# Patient Record
Sex: MALE | Race: WHITE | NOT HISPANIC OR LATINO
[De-identification: names, ages, dates, MRNs, and addresses within clinical notes are randomized per-mention and may not be internally consistent; named-entity substitution may affect disease eponyms.]

---

## 2017-02-22 ENCOUNTER — APPOINTMENT (OUTPATIENT)
Dept: UROLOGY | Facility: CLINIC | Age: 62
End: 2017-02-22

## 2017-02-22 VITALS
SYSTOLIC BLOOD PRESSURE: 114 MMHG | HEART RATE: 58 BPM | DIASTOLIC BLOOD PRESSURE: 67 MMHG | WEIGHT: 163 LBS | BODY MASS INDEX: 23.34 KG/M2 | HEIGHT: 70 IN | TEMPERATURE: 97.5 F | RESPIRATION RATE: 17 BRPM

## 2017-02-27 ENCOUNTER — TRANSCRIPTION ENCOUNTER (OUTPATIENT)
Age: 62
End: 2017-02-27

## 2017-02-28 LAB — COMPN STONE: NORMAL

## 2017-08-23 ENCOUNTER — OUTPATIENT (OUTPATIENT)
Dept: OUTPATIENT SERVICES | Facility: HOSPITAL | Age: 62
LOS: 1 days | End: 2017-08-23
Payer: COMMERCIAL

## 2017-08-23 ENCOUNTER — APPOINTMENT (OUTPATIENT)
Dept: UROLOGY | Facility: CLINIC | Age: 62
End: 2017-08-23
Payer: COMMERCIAL

## 2017-08-23 VITALS
BODY MASS INDEX: 30.58 KG/M2 | WEIGHT: 162 LBS | DIASTOLIC BLOOD PRESSURE: 64 MMHG | TEMPERATURE: 98.6 F | HEART RATE: 71 BPM | HEIGHT: 61 IN | RESPIRATION RATE: 17 BRPM | SYSTOLIC BLOOD PRESSURE: 100 MMHG

## 2017-08-23 DIAGNOSIS — R53.0 NEOPLASTIC (MALIGNANT) RELATED FATIGUE: ICD-10-CM

## 2017-08-23 PROCEDURE — 99213 OFFICE O/P EST LOW 20 MIN: CPT | Mod: 25

## 2017-08-23 PROCEDURE — 76775 US EXAM ABDO BACK WALL LIM: CPT | Mod: 26

## 2017-08-23 PROCEDURE — 76775 US EXAM ABDO BACK WALL LIM: CPT

## 2017-08-25 DIAGNOSIS — N20.0 CALCULUS OF KIDNEY: ICD-10-CM

## 2017-08-28 LAB — PSA SERPL-MCNC: 0.68 NG/ML

## 2017-09-08 ENCOUNTER — APPOINTMENT (OUTPATIENT)
Dept: UROLOGY | Facility: CLINIC | Age: 62
End: 2017-09-08
Payer: SELF-PAY

## 2017-09-08 PROCEDURE — 99404 PREV MED CNSL INDIV APPRX 60: CPT

## 2017-12-13 ENCOUNTER — APPOINTMENT (OUTPATIENT)
Dept: UROLOGY | Facility: CLINIC | Age: 62
End: 2017-12-13
Payer: COMMERCIAL

## 2017-12-13 PROCEDURE — 99213 OFFICE O/P EST LOW 20 MIN: CPT

## 2018-02-01 ENCOUNTER — RX RENEWAL (OUTPATIENT)
Age: 63
End: 2018-02-01

## 2018-06-15 ENCOUNTER — APPOINTMENT (OUTPATIENT)
Dept: UROLOGY | Facility: CLINIC | Age: 63
End: 2018-06-15
Payer: MEDICARE

## 2018-06-15 ENCOUNTER — OUTPATIENT (OUTPATIENT)
Dept: OUTPATIENT SERVICES | Facility: HOSPITAL | Age: 63
LOS: 1 days | End: 2018-06-15
Payer: MEDICARE

## 2018-06-15 DIAGNOSIS — R35.0 FREQUENCY OF MICTURITION: ICD-10-CM

## 2018-06-15 PROCEDURE — 76775 US EXAM ABDO BACK WALL LIM: CPT | Mod: 26

## 2018-06-15 PROCEDURE — 76775 US EXAM ABDO BACK WALL LIM: CPT

## 2018-06-15 PROCEDURE — 99213 OFFICE O/P EST LOW 20 MIN: CPT

## 2018-06-18 DIAGNOSIS — N20.0 CALCULUS OF KIDNEY: ICD-10-CM

## 2018-08-28 ENCOUNTER — RX RENEWAL (OUTPATIENT)
Age: 63
End: 2018-08-28

## 2018-12-12 ENCOUNTER — APPOINTMENT (OUTPATIENT)
Dept: UROLOGY | Facility: CLINIC | Age: 63
End: 2018-12-12
Payer: MEDICARE

## 2018-12-12 ENCOUNTER — OUTPATIENT (OUTPATIENT)
Dept: OUTPATIENT SERVICES | Facility: HOSPITAL | Age: 63
LOS: 1 days | End: 2018-12-12
Payer: MEDICARE

## 2018-12-12 VITALS
BODY MASS INDEX: 22.9 KG/M2 | HEIGHT: 70 IN | DIASTOLIC BLOOD PRESSURE: 50 MMHG | HEART RATE: 62 BPM | RESPIRATION RATE: 17 BRPM | SYSTOLIC BLOOD PRESSURE: 90 MMHG | TEMPERATURE: 97.5 F | WEIGHT: 160 LBS

## 2018-12-12 PROCEDURE — 99213 OFFICE O/P EST LOW 20 MIN: CPT | Mod: 25

## 2018-12-12 PROCEDURE — 76775 US EXAM ABDO BACK WALL LIM: CPT

## 2018-12-12 PROCEDURE — 76775 US EXAM ABDO BACK WALL LIM: CPT | Mod: 26

## 2018-12-13 LAB
24R-OH-CALCIDIOL SERPL-MCNC: 45.3 PG/ML
25(OH)D3 SERPL-MCNC: 36.8 NG/ML
ANION GAP SERPL CALC-SCNC: 14 MMOL/L
BUN SERPL-MCNC: 21 MG/DL
CA-I SERPL-SCNC: 1.2 MMOL/L
CALCIUM SERPL-MCNC: 9.4 MG/DL
CALCIUM SERPL-MCNC: 9.4 MG/DL
CHLORIDE SERPL-SCNC: 98 MMOL/L
CO2 SERPL-SCNC: 31 MMOL/L
CREAT SERPL-MCNC: 0.81 MG/DL
GLUCOSE SERPL-MCNC: 74 MG/DL
PARATHYROID HORMONE INTACT: 41 PG/ML
POTASSIUM SERPL-SCNC: 4.3 MMOL/L
PSA SERPL-MCNC: 0.98 NG/ML
SODIUM SERPL-SCNC: 143 MMOL/L
URATE SERPL-MCNC: 6.4 MG/DL

## 2018-12-14 DIAGNOSIS — N20.0 CALCULUS OF KIDNEY: ICD-10-CM

## 2018-12-30 ENCOUNTER — TRANSCRIPTION ENCOUNTER (OUTPATIENT)
Age: 63
End: 2018-12-30

## 2019-03-10 ENCOUNTER — TRANSCRIPTION ENCOUNTER (OUTPATIENT)
Age: 64
End: 2019-03-10

## 2019-03-13 ENCOUNTER — APPOINTMENT (OUTPATIENT)
Dept: UROLOGY | Facility: CLINIC | Age: 64
End: 2019-03-13
Payer: MEDICARE

## 2019-03-13 VITALS
BODY MASS INDEX: 22.19 KG/M2 | SYSTOLIC BLOOD PRESSURE: 106 MMHG | DIASTOLIC BLOOD PRESSURE: 64 MMHG | HEART RATE: 80 BPM | TEMPERATURE: 98.1 F | WEIGHT: 155 LBS | HEIGHT: 70 IN | RESPIRATION RATE: 17 BRPM

## 2019-03-13 DIAGNOSIS — R35.0 FREQUENCY OF MICTURITION: ICD-10-CM

## 2019-03-13 PROCEDURE — 99213 OFFICE O/P EST LOW 20 MIN: CPT

## 2019-03-13 NOTE — ADDENDUM
[FreeTextEntry1] :  I, Elodia Darden, acted solely as a scribe for Dr. Sanya Moeller. The documentation recorded by the scribe accurately reflects the service I personally performed and the decision by me.\par

## 2019-03-13 NOTE — HISTORY OF PRESENT ILLNESS
[FreeTextEntry1] : 64 year old male presents for evaluation of test results.\par Nocturia x1-2 with occasional feeling of incomplete emptying especially during the day.\par He c/o diurnal frequency.\par Last PSA in Dec 2018 was 0.98.\par LL from 02/20/19 shows consistently high Na, Ox, and Ca levels and PCR levels. He is still very SS CaOx and SS CaP.\par He is on K Chloride and HCTZ.\par He has also seen nutritionist previously.\par Pt reports being very conscious of what he consumes.\par LL in 5 months and RTO in 6 months for results and US\par Scheduled for UDS

## 2019-03-13 NOTE — PHYSICAL EXAM
[No Prostate Nodules] : no prostate nodules [Prostate Size ___ gm] : prostate size [unfilled] gm [FreeTextEntry1] : smooth and regular

## 2019-03-15 ENCOUNTER — RX RENEWAL (OUTPATIENT)
Age: 64
End: 2019-03-15

## 2019-04-03 ENCOUNTER — APPOINTMENT (OUTPATIENT)
Dept: UROLOGY | Facility: CLINIC | Age: 64
End: 2019-04-03
Payer: MEDICARE

## 2019-04-03 ENCOUNTER — OUTPATIENT (OUTPATIENT)
Dept: OUTPATIENT SERVICES | Facility: HOSPITAL | Age: 64
LOS: 1 days | End: 2019-04-03
Payer: MEDICARE

## 2019-04-03 VITALS — DIASTOLIC BLOOD PRESSURE: 69 MMHG | TEMPERATURE: 97.7 F | SYSTOLIC BLOOD PRESSURE: 130 MMHG | HEART RATE: 61 BPM

## 2019-04-03 DIAGNOSIS — R35.0 FREQUENCY OF MICTURITION: ICD-10-CM

## 2019-04-03 PROCEDURE — 51797 INTRAABDOMINAL PRESSURE TEST: CPT | Mod: 26

## 2019-04-03 PROCEDURE — 51741 ELECTRO-UROFLOWMETRY FIRST: CPT | Mod: 26,59

## 2019-04-03 PROCEDURE — 51728 CYSTOMETROGRAM W/VP: CPT | Mod: 26

## 2019-04-03 PROCEDURE — 51784 ANAL/URINARY MUSCLE STUDY: CPT

## 2019-04-03 PROCEDURE — 51797 INTRAABDOMINAL PRESSURE TEST: CPT

## 2019-04-03 PROCEDURE — 51741 ELECTRO-UROFLOWMETRY FIRST: CPT

## 2019-04-03 PROCEDURE — 51784 ANAL/URINARY MUSCLE STUDY: CPT | Mod: 26

## 2019-04-03 PROCEDURE — 51728 CYSTOMETROGRAM W/VP: CPT

## 2019-04-03 RX ORDER — TAMSULOSIN HYDROCHLORIDE 0.4 MG/1
0.4 CAPSULE ORAL
Qty: 30 | Refills: 6 | Status: ACTIVE | COMMUNITY
Start: 2019-04-03 | End: 1900-01-01

## 2019-04-03 NOTE — ASSESSMENT
[FreeTextEntry1] : On UDS evaluation today, pt has an obstructed pattern and high bladder pressure.\par However, he empties his bladder adequately.\par Discussed possibility of definitive treatment in the future.\par In the meantime, we will treat him conservatively.\par Prescribed Flomax. Explained risks/benefits of medication.\par RTO in 1 month

## 2019-04-03 NOTE — HISTORY OF PRESENT ILLNESS
[FreeTextEntry1] : 64 year old male presents for UDS evaluation today.\par On UDS evaluation today, pt has an obstructed pattern and high bladder pressure.\par However, he empties his bladder adequately.\par Discussed possibility of definitive treatment in the future.\par In the meantime, we will treat him conservatively.\par Prescribed Flomax. Explained risks/benefits of medication.\par RTO in 1 month for uroflow

## 2019-04-05 DIAGNOSIS — N40.1 BENIGN PROSTATIC HYPERPLASIA WITH LOWER URINARY TRACT SYMPTOMS: ICD-10-CM

## 2019-05-02 ENCOUNTER — APPOINTMENT (OUTPATIENT)
Dept: UROLOGY | Facility: CLINIC | Age: 64
End: 2019-05-02
Payer: MEDICARE

## 2019-05-02 DIAGNOSIS — N40.1 BENIGN PROSTATIC HYPERPLASIA WITH LOWER URINARY TRACT SYMPMS: ICD-10-CM

## 2019-05-02 DIAGNOSIS — N13.8 BENIGN PROSTATIC HYPERPLASIA WITH LOWER URINARY TRACT SYMPMS: ICD-10-CM

## 2019-05-02 PROCEDURE — 99213 OFFICE O/P EST LOW 20 MIN: CPT

## 2019-05-02 NOTE — HISTORY OF PRESENT ILLNESS
[FreeTextEntry1] : 64 year old male presents for BPH f/u.\par On UDS evaluation 04/03/19, pt has an obstructed pattern and high bladder pressure.\par Nocturia x0 \par He does not have dizziness on Flomax, but he does c/o urgency when he stands up.\par He keeps a urinal near him in case of leakage when he stands up after prolonged periods of sitting. \par I've suggested pt consider TURP, but at the present moment, he is able to cope.\par Renewed Flomax.\par RTO in 4 months

## 2019-05-02 NOTE — ASSESSMENT
[FreeTextEntry1] : I've suggested pt consider TURP, but at the present moment, he is able to cope.\par Renewed Flomax.\par RTO in 4 months

## 2019-09-09 ENCOUNTER — RX RENEWAL (OUTPATIENT)
Age: 64
End: 2019-09-09

## 2019-09-10 ENCOUNTER — TRANSCRIPTION ENCOUNTER (OUTPATIENT)
Age: 64
End: 2019-09-10

## 2019-09-13 ENCOUNTER — OUTPATIENT (OUTPATIENT)
Dept: OUTPATIENT SERVICES | Facility: HOSPITAL | Age: 64
LOS: 1 days | End: 2019-09-13
Payer: MEDICARE

## 2019-09-13 ENCOUNTER — APPOINTMENT (OUTPATIENT)
Dept: UROLOGY | Facility: CLINIC | Age: 64
End: 2019-09-13
Payer: MEDICARE

## 2019-09-13 DIAGNOSIS — R35.0 FREQUENCY OF MICTURITION: ICD-10-CM

## 2019-09-13 PROCEDURE — 76775 US EXAM ABDO BACK WALL LIM: CPT

## 2019-09-13 PROCEDURE — 76775 US EXAM ABDO BACK WALL LIM: CPT | Mod: 26

## 2019-09-13 PROCEDURE — 99213 OFFICE O/P EST LOW 20 MIN: CPT | Mod: 25

## 2019-09-13 NOTE — PHYSICAL EXAM
[General Appearance - Well Developed] : well developed [General Appearance - Well Nourished] : well nourished [Normal Appearance] : normal appearance [Well Groomed] : well groomed [General Appearance - In No Acute Distress] : no acute distress [Abdomen Soft] : soft [Abdomen Tenderness] : non-tender [Costovertebral Angle Tenderness] : no ~M costovertebral angle tenderness [No Prostate Nodules] : no prostate nodules [Prostate Size ___ gm] : prostate size [unfilled] gm [Edema] : no peripheral edema [Respiration, Rhythm And Depth] : normal respiratory rhythm and effort [] : no respiratory distress [Exaggerated Use Of Accessory Muscles For Inspiration] : no accessory muscle use [Oriented To Time, Place, And Person] : oriented to person, place, and time [Affect] : the affect was normal [Mood] : the mood was normal [Not Anxious] : not anxious [Normal Station and Gait] : the gait and station were normal for the patient's age [No Focal Deficits] : no focal deficits [No Palpable Adenopathy] : no palpable adenopathy [FreeTextEntry1] : smooth and regular

## 2019-09-13 NOTE — ADDENDUM
[FreeTextEntry1] :  I, Elodia Darden, acted solely as a scribe for Dr. Sanya Moeller. The documentation recorded by the scribe accurately reflects the service I personally performed and the decision by me.

## 2019-09-13 NOTE — HISTORY OF PRESENT ILLNESS
[FreeTextEntry1] : 64 year old male presents for evaluation of test results.\par Nocturia occasionally with good stream\par No feeling of incomplete emptying\par \par Last PSA in December 2018 was 0.98\par US renal today shows previously visualized punctuate calculus in the right kidney not appreciated on this exam. Again visualized scattered small non obstructing stones in the left kidney lower pole the largest 4.8 mm, stable in size. Both kidneys are normal in size and echogenicity without obvious hydronephrosis or solid masses visualized. \par LL from 08/29/19 shows good output, SS CaOx, high Ca, high Ox, SS CaP, elevated UA, high protein and high Na\par Advised pt to cut down on protein and sodium in diet. \par Pt's stones do not warrant treatment at the present time. \par LL and RTO in 1 month for results.

## 2019-09-13 NOTE — ASSESSMENT
[FreeTextEntry1] : Last PSA in December 2018 was 0.98\par US renal today shows previously visualized punctuate calculus in the right kidney not appreciated on this exam. Again visualized scattered small non obstructing stones in the left kidney lower pole the largest 4.8 mm, stable in size. Both kidneys are normal in size and echogenicity without obvious hydronephrosis or solid masses visualized. \par LL from 08/29/19 shows good output, SS CaOx, high Ca, high Ox, SS CaP, elevated UA, high protein and high Na\par Advised pt to cut down on protein and sodium in diet. \par Pt's stones do not warrant treatment at the present time. \par LL and RTO in 1 month for results.

## 2019-09-17 DIAGNOSIS — N20.0 CALCULUS OF KIDNEY: ICD-10-CM

## 2019-10-11 ENCOUNTER — APPOINTMENT (OUTPATIENT)
Dept: UROLOGY | Facility: CLINIC | Age: 64
End: 2019-10-11
Payer: MEDICARE

## 2019-10-11 VITALS — HEART RATE: 73 BPM | TEMPERATURE: 97.8 F | DIASTOLIC BLOOD PRESSURE: 76 MMHG | SYSTOLIC BLOOD PRESSURE: 119 MMHG

## 2019-10-11 DIAGNOSIS — N20.0 CALCULUS OF KIDNEY: ICD-10-CM

## 2019-10-11 PROCEDURE — 99213 OFFICE O/P EST LOW 20 MIN: CPT

## 2019-10-11 NOTE — ADDENDUM
[FreeTextEntry1] :  I, Nani Savage, acted solely as a scribe for Dr. Sanya Moeller. The documentation recorded by the scribe accurately reflects the service I personally performed and the decision by me.\par

## 2019-10-11 NOTE — ASSESSMENT
[FreeTextEntry1] : US renal on 09/13/19 shows previously visualized punctuate calculus in the right kidney not appreciated on this exam. Again visualized scattered small non obstructing stones in the left kidney lower pole the largest 4.8 mm, stable in size. Both kidneys are normal in size and echogenicity without obvious hydronephrosis or solid masses visualized. \par Last PSA in December 2018 was 0.98\par LL from 09/30/19 shows good output, but high Ox. pH and Citrates are slightly high, but have decreased \par Advised to reduce oxalate intake.\par LL in 5 months and RTO in 6 months for results and US

## 2019-10-11 NOTE — PHYSICAL EXAM
[General Appearance - Well Developed] : well developed [General Appearance - Well Nourished] : well nourished [Normal Appearance] : normal appearance [Well Groomed] : well groomed [General Appearance - In No Acute Distress] : no acute distress [Abdomen Soft] : soft [Abdomen Tenderness] : non-tender [Costovertebral Angle Tenderness] : no ~M costovertebral angle tenderness [Urethral Meatus] : meatus normal [Urinary Bladder Findings] : the bladder was normal on palpation [Scrotum] : the scrotum was normal [Testes Mass (___cm)] : there were no testicular masses [No Prostate Nodules] : no prostate nodules [Edema] : no peripheral edema [] : no respiratory distress [Respiration, Rhythm And Depth] : normal respiratory rhythm and effort [Exaggerated Use Of Accessory Muscles For Inspiration] : no accessory muscle use [Affect] : the affect was normal [Oriented To Time, Place, And Person] : oriented to person, place, and time [Not Anxious] : not anxious [Mood] : the mood was normal [No Focal Deficits] : no focal deficits [Normal Station and Gait] : the gait and station were normal for the patient's age [No Palpable Adenopathy] : no palpable adenopathy

## 2019-10-11 NOTE — HISTORY OF PRESENT ILLNESS
[FreeTextEntry1] : 64 year old male presents for evaluation of test results.\par Pt is taking HCTZ\par \par US renal on 09/13/19 shows previously visualized punctuate calculus in the right kidney not appreciated on this exam. Again visualized scattered small non obstructing stones in the left kidney lower pole the largest 4.8 mm, stable in size. Both kidneys are normal in size and echogenicity without obvious hydronephrosis or solid masses visualized. \par Last PSA in December 2018 was 0.98\par LL from 09/30/19 shows good output, but high Ox. pH and Citrates are slightly high, but have decreased \par Advised to reduce oxalate intake.\par LL in 5 months and RTO in 6 months for results and US

## 2020-03-13 ENCOUNTER — RX RENEWAL (OUTPATIENT)
Age: 65
End: 2020-03-13

## 2020-06-08 ENCOUNTER — RX RENEWAL (OUTPATIENT)
Age: 65
End: 2020-06-08

## 2020-06-26 ENCOUNTER — APPOINTMENT (OUTPATIENT)
Dept: UROLOGY | Facility: CLINIC | Age: 65
End: 2020-06-26

## 2020-06-29 ENCOUNTER — APPOINTMENT (OUTPATIENT)
Dept: UROLOGY | Facility: CLINIC | Age: 65
End: 2020-06-29
Payer: MEDICARE

## 2020-06-29 PROCEDURE — 99214 OFFICE O/P EST MOD 30 MIN: CPT | Mod: 95

## 2020-06-29 NOTE — HISTORY OF PRESENT ILLNESS
[Other Location: e.g. School (Enter Location, City,State)___] : at [unfilled], at the time of the visit. [FreeTextEntry1] : Pt.is doing well urologically. \par He has a good stream and no nocturia. \par His LL this month is bad......High Na,Ca, Oxalate and PCR.\par Apparently he decided on a soecial diet to combat Covid 19\par He says  he is back on his regular diet. \par I  suggest we send him a LL to do in 2 weeks and schedule an US for\par  Mid August

## 2021-03-08 ENCOUNTER — RX RENEWAL (OUTPATIENT)
Age: 66
End: 2021-03-08

## 2021-06-10 ENCOUNTER — RX RENEWAL (OUTPATIENT)
Age: 66
End: 2021-06-10

## 2021-09-01 ENCOUNTER — RX RENEWAL (OUTPATIENT)
Age: 66
End: 2021-09-01

## 2021-09-01 RX ORDER — POTASSIUM CHLORIDE 750 MG/1
10 CAPSULE, EXTENDED RELEASE ORAL
Qty: 90 | Refills: 3 | Status: ACTIVE | COMMUNITY
Start: 2017-08-23 | End: 1900-01-01

## 2021-09-07 ENCOUNTER — RX RENEWAL (OUTPATIENT)
Age: 66
End: 2021-09-07

## 2021-10-12 ENCOUNTER — OUTPATIENT (OUTPATIENT)
Dept: OUTPATIENT SERVICES | Facility: HOSPITAL | Age: 66
LOS: 1 days | End: 2021-10-12
Payer: MEDICARE

## 2021-10-12 ENCOUNTER — APPOINTMENT (OUTPATIENT)
Dept: UROLOGY | Facility: CLINIC | Age: 66
End: 2021-10-12
Payer: MEDICARE

## 2021-10-12 DIAGNOSIS — R35.0 FREQUENCY OF MICTURITION: ICD-10-CM

## 2021-10-12 DIAGNOSIS — Z87.442 PERSONAL HISTORY OF URINARY CALCULI: ICD-10-CM

## 2021-10-12 PROCEDURE — 99213 OFFICE O/P EST LOW 20 MIN: CPT

## 2021-10-12 PROCEDURE — 76775 US EXAM ABDO BACK WALL LIM: CPT | Mod: 26

## 2021-10-12 PROCEDURE — 76775 US EXAM ABDO BACK WALL LIM: CPT

## 2021-10-12 NOTE — PHYSICAL EXAM
[General Appearance - Well Developed] : well developed [General Appearance - Well Nourished] : well nourished [Normal Appearance] : normal appearance [Well Groomed] : well groomed [General Appearance - In No Acute Distress] : no acute distress [Abdomen Tenderness] : non-tender [Abdomen Soft] : soft [Edema] : no peripheral edema [] : no respiratory distress [Respiration, Rhythm And Depth] : normal respiratory rhythm and effort [Exaggerated Use Of Accessory Muscles For Inspiration] : no accessory muscle use [Oriented To Time, Place, And Person] : oriented to person, place, and time [Affect] : the affect was normal [Mood] : the mood was normal [Not Anxious] : not anxious [Normal Station and Gait] : the gait and station were normal for the patient's age [No Focal Deficits] : no focal deficits [No Palpable Adenopathy] : no palpable adenopathy

## 2021-10-12 NOTE — HISTORY OF PRESENT ILLNESS
[FreeTextEntry1] : The patient is a 66 year old male presenting today for a follow up for a history of kidney stones.   \par Denies nocturia\par No pain or discomfort associated with his stones.\par He has lost a significant amount of weight. \par He exercises daily and is following a specific diet.\par The patient takes Tamsulosin 0.4 mg, one capsule in the evening, potassium chloride 10 meq, one capsule daliy, and HCTZ 50 mg, one tablet daily. \par \par His Litholink from 6/26/21 showed normal urine output, SS CaOx, very high oxalate, high sodium, SS CaP, high protein and high sodium. \par \par The renal US from 10/12/21 demonstrated:\par Right kidney: 9.9 x 5.1 x 4.3 cm \par Cortical Thickness: 1.3 cm UP 1.4 cm LP \par \par Left kidney: 11.0 x 5.1 x 4.8 cm			 \par Cortical Thickness: 1.3 cm UP 1.0 cm LP \par  \par Echogenicity: Normal\par \par Bladder: Not visualized \par \par Findings: There are bilateral nonobstructing stones , in the right kidney lower pole 6.1 mm and in the left kidney lower pole 4.9 mm and 3.9 mm Both kidneys are normal in size and echogenicity without hydronephrosis or solid masses visualized. \par \par Blood work today includes PSA. \par \par I highly suggest that he cuts back on his oxalate, protein, and salt intake. \par \par The patient will repeat the Litholink in 2 months. \par \par The patient will return to the office in 3 months.

## 2021-11-08 ENCOUNTER — RX RENEWAL (OUTPATIENT)
Age: 66
End: 2021-11-08

## 2022-01-07 ENCOUNTER — RX RENEWAL (OUTPATIENT)
Age: 67
End: 2022-01-07

## 2022-01-07 RX ORDER — TAMSULOSIN HYDROCHLORIDE 0.4 MG/1
0.4 CAPSULE ORAL
Qty: 60 | Refills: 0 | Status: ACTIVE | COMMUNITY
Start: 2019-05-02 | End: 1900-01-01

## 2022-01-11 ENCOUNTER — APPOINTMENT (OUTPATIENT)
Dept: UROLOGY | Facility: CLINIC | Age: 67
End: 2022-01-11
Payer: MEDICARE

## 2022-01-11 PROCEDURE — 99212 OFFICE O/P EST SF 10 MIN: CPT

## 2022-01-11 NOTE — ASSESSMENT
[FreeTextEntry1] : The patient is a 67 year old male presenting today for a follow up visit to review Litholink results. \par Denies nocturia\par Denies any urinary difficulties. \par He currently takes potassium chloride 10 meq, one capsule daily and HCTZ 50 mg, one tablet daily. \par He reports he eats Vitamin C drops regularly for a chronic sore throat, but stops taking them 5 days before his Litholink collection normally.\par \par His Litholink from 12/21/21 showed improved urine output, improved but elevated oxalates, elevated sodium, high urine pH, and improved protein intake. \par \par Blood work today includes prostate Ca screen. \par \par I recommend he continue hydrating well and continue monitoring his oxalate intake. He should reduce his sodium intake and continue monitoring his protein intake. \par I recommend he try to use a throat drop that does not contain Vitamin C.\par \par Before his next visit, the patient will have a KUB X-ray. He will then see Dr. Garcia who will decide if URS with stone removal is indicated. \par He will complete a Litholink one month prior to his next visit. \par \par The patient will return to the office in 6 months. \par \par I spent 15 minutes with the patient.

## 2022-01-11 NOTE — HISTORY OF PRESENT ILLNESS
[FreeTextEntry1] : The patient is a 67 year old male presenting today for a follow up visit to review Litholink results. \par Denies nocturia\par Denies any urinary difficulties. \par He currently takes potassium chloride 10 meq, one capsule daily and HCTZ 50 mg, one tablet daily. \par He reports he eats Vitamin C drops regularly for a chronic sore throat, but stops taking them 5 days before his Litholink collection normally.\par \par His Litholink from 12/21/21 showed improved urine output, improved but elevated oxalates, elevated sodium, high urine pH, and improved protein intake. \par \par Blood work today includes prostate Ca screen. \par \par I recommend he continue hydrating well and continue monitoring his oxalate intake. He should reduce his sodium intake and continue monitoring his protein intake. \par I recommend he try to use a throat drop that does not contain Vitamin C.\par \par Before his next visit, the patient will have a KUB X-ray. He will then see Dr. Garcia who will decide if URS with stone removal is indicated. \par He will complete a Litholink one month prior to his next visit. \par \par The patient will return to the office in 6 months.

## 2022-01-12 LAB — PSA SERPL-MCNC: 1.38 NG/ML

## 2022-03-03 ENCOUNTER — RX RENEWAL (OUTPATIENT)
Age: 67
End: 2022-03-03

## 2022-03-03 RX ORDER — HYDROCHLOROTHIAZIDE 50 MG/1
50 TABLET ORAL
Qty: 90 | Refills: 3 | Status: ACTIVE | COMMUNITY
Start: 2017-02-22 | End: 1900-01-01

## 2022-04-15 ENCOUNTER — RX RENEWAL (OUTPATIENT)
Age: 67
End: 2022-04-15

## 2022-07-12 ENCOUNTER — APPOINTMENT (OUTPATIENT)
Dept: UROLOGY | Facility: CLINIC | Age: 67
End: 2022-07-12

## 2022-07-12 ENCOUNTER — OUTPATIENT (OUTPATIENT)
Dept: OUTPATIENT SERVICES | Facility: HOSPITAL | Age: 67
LOS: 1 days | End: 2022-07-12
Payer: MEDICARE

## 2022-07-12 VITALS
SYSTOLIC BLOOD PRESSURE: 97 MMHG | WEIGHT: 155 LBS | HEART RATE: 65 BPM | BODY MASS INDEX: 22.19 KG/M2 | DIASTOLIC BLOOD PRESSURE: 59 MMHG | TEMPERATURE: 98 F | HEIGHT: 70 IN

## 2022-07-12 DIAGNOSIS — N40.1 BENIGN PROSTATIC HYPERPLASIA WITH LOWER URINARY TRACT SYMPMS: ICD-10-CM

## 2022-07-12 DIAGNOSIS — N13.8 BENIGN PROSTATIC HYPERPLASIA WITH LOWER URINARY TRACT SYMPMS: ICD-10-CM

## 2022-07-12 PROCEDURE — 99212 OFFICE O/P EST SF 10 MIN: CPT

## 2022-07-12 PROCEDURE — 76775 US EXAM ABDO BACK WALL LIM: CPT | Mod: 26

## 2022-07-12 PROCEDURE — 76775 US EXAM ABDO BACK WALL LIM: CPT

## 2022-07-12 NOTE — PHYSICAL EXAM
[General Appearance - Well Developed] : well developed [General Appearance - Well Nourished] : well nourished [Normal Appearance] : normal appearance [Well Groomed] : well groomed [General Appearance - In No Acute Distress] : no acute distress [Abdomen Soft] : soft [Abdomen Tenderness] : non-tender [Costovertebral Angle Tenderness] : no ~M costovertebral angle tenderness [Urethral Meatus] : meatus normal [Urinary Bladder Findings] : the bladder was normal on palpation [Scrotum] : the scrotum was normal [Testes Mass (___cm)] : there were no testicular masses [No Prostate Nodules] : no prostate nodules [Edema] : no peripheral edema [] : no respiratory distress [Respiration, Rhythm And Depth] : normal respiratory rhythm and effort [Exaggerated Use Of Accessory Muscles For Inspiration] : no accessory muscle use [Oriented To Time, Place, And Person] : oriented to person, place, and time [Affect] : the affect was normal [Mood] : the mood was normal [Not Anxious] : not anxious [Normal Station and Gait] : the gait and station were normal for the patient's age [No Focal Deficits] : no focal deficits [No Palpable Adenopathy] : no palpable adenopathy [FreeTextEntry1] : 30g, no nodules, soft

## 2022-07-12 NOTE — HISTORY OF PRESENT ILLNESS
[FreeTextEntry1] : : Anthony  3 1955 \par Referring Provider: none \par \par HPI: Mr. MAUREEN DUKE is a 67 year yo M with a PMHx notable for kidney stones. Denies nocturia. Denies any urinary difficulties. \par \par He currently takes potassium chloride 10 meq, one capsule daily and HCTZ 50 mg, one tablet daily. He reports he eats Vitamin C drops regularly for a chronic sore throat, but stops taking them 5 days before his Litholink collection normally. He takes an MVI daily as well. \par \par Emptying well, no issues with urinating. Feels completely emptying. No issues with retention. No urgency. Nocturia x 0 . \par \par PSA 1.28 ng/mL. No FHx of CaP (adopted). \par \par ULS:  Again visualized bilateral renal echogenic foci with twinkle and posterior shadowing artifact. Right lower pole focus measuring 5.8 mm, and left lower pole multiple foci measuring 3.6 mm and 5.4 mm 2 stones in R, 3-4 in L kidney. \par LL: excellent UVol, UCa and Alli elevated despite clean diet. On HCTZ 50mg daily. \par \par  [Urinary Incontinence] : no urinary incontinence [Urinary Retention] : no urinary retention [Urinary Urgency] : no urinary urgency [Urinary Frequency] : no urinary frequency

## 2022-07-22 DIAGNOSIS — N40.1 BENIGN PROSTATIC HYPERPLASIA WITH LOWER URINARY TRACT SYMPTOMS: ICD-10-CM

## 2022-07-22 DIAGNOSIS — N20.0 CALCULUS OF KIDNEY: ICD-10-CM

## 2022-08-29 ENCOUNTER — RX RENEWAL (OUTPATIENT)
Age: 67
End: 2022-08-29

## 2022-09-11 RX ORDER — POTASSIUM CITRATE 10 MEQ/1
10 MEQ TABLET, EXTENDED RELEASE ORAL TWICE DAILY
Qty: 180 | Refills: 3 | Status: ACTIVE | COMMUNITY
Start: 2017-02-22 | End: 1900-01-01

## 2022-10-23 ENCOUNTER — RX RENEWAL (OUTPATIENT)
Age: 67
End: 2022-10-23

## 2022-10-23 RX ORDER — CHLORTHALIDONE 25 MG/1
25 TABLET ORAL DAILY
Qty: 30 | Refills: 0 | Status: ACTIVE | COMMUNITY
Start: 2022-07-12 | End: 1900-01-01

## 2022-10-26 ENCOUNTER — TRANSCRIPTION ENCOUNTER (OUTPATIENT)
Age: 67
End: 2022-10-26

## 2023-01-12 ENCOUNTER — APPOINTMENT (OUTPATIENT)
Dept: UROLOGY | Facility: CLINIC | Age: 68
End: 2023-01-12